# Patient Record
Sex: FEMALE | Race: WHITE | NOT HISPANIC OR LATINO | Employment: OTHER | URBAN - METROPOLITAN AREA
[De-identification: names, ages, dates, MRNs, and addresses within clinical notes are randomized per-mention and may not be internally consistent; named-entity substitution may affect disease eponyms.]

---

## 2018-03-16 NOTE — PATIENT DISCUSSION
1.  Nuclear Sclerotic Cataract OU: Explained how cataracts can effect vision. Recommend clinical observation. The patient was advised to contact us if any change or worsening of vision. 2. 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 Decompensated phoria. Try Fresnel 2 BI OD. If successful then have ground into glasses. 3.  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 Stressed the importance of regular followup and good control of BS BP and Lipids to avoid future complications. Patient was found to have macular edema on examination and testing today. Risks benefits and alternatives of treatment were discussed. 4. Return for an appointment in 2 weeks for office call and Reraction. with Dr. Ashkan Kiran.

## 2018-03-28 NOTE — PATIENT DISCUSSION
1.  Nuclear Sclerotic Cataract OU: Explained how cataracts can effect vision. Recommend clinical observation. The patient was advised to contact us if any change or worsening of vision. 2. Refractive error - Have new glasses with 1.5 BI OU. 3. Diplopia - significant decrease in diplopia frequency with 2 BI Fresnel OD. Increase prism to total of 3 BI. 4. Return for an appointment in 6 months for comprehensive exam. with Dr. Jenn Hooks.

## 2018-03-28 NOTE — PATIENT DISCUSSION
Diplopia - significant decrease in diplopia frequency with 2 BI Fresnel OD. Increase prism to total of 3 BI.

## 2018-09-26 NOTE — PATIENT DISCUSSION
1.  Nuclear Sclerotic Cataract OU: Monitor. 2. Diplopia - progressive decompensation of phoria. Has 3 BI in glasses but getting intermittent diplopia now especially at near. Patient to try 5 minutes of push up exercises in am and pm for 3-4 weeks to see if helps with fusion. If not will need more prism in glasses. 3. Return for an appointment in 3 weeks for office call. with Dr. Nick Salamanca.

## 2018-09-26 NOTE — PATIENT DISCUSSION
1. DM II with moderate Non-proliferative Diabetic Retinopathy OU:  Discussed the pathophysiology of diabetes and its effect on the eye. Stressed the importance of regular followup and good control of BS BP and Lipids to avoid future complications. 2. Nuclear Sclerotic Cataract OU: Monitor. 3. Diplopia - progressive decompensation of phoria. Has 3 BI in glasses but getting intermittent diplopia now especially at near. Patient to try 5 minutes of push up exercises in am and pm for 3-4 weeks to see if helps with fusion. If not will need more prism in glasses. 4. Return for an appointment in 3 weeks for office call/DF with Dr. Milan Miramontes.

## 2018-10-30 NOTE — PATIENT DISCUSSION
Return for an appointment in 8 months for comprehensive exam. and Optos Fundus Photo Macula. with Dr. Oscar Crisostomo.

## 2018-10-30 NOTE — PATIENT DISCUSSION
1.  PVD OU:  Patient was cautioned to call our office immediately if they experience a substantial change in their symptoms such as an increase in floaters persistent flashes loss of visual field (may appear as a shadow or a curtain) or decrease in visual acuity as these may indicate a retinal tear or detachment. If this is a new problem patient will need to return for re-examination  as determined by the [address]. DM II with moderate Non-proliferative Diabetic Retinopathy OU:  Discussed the pathophysiology of diabetes and its effect on the eye. Stressed the importance of regular followup and good control of BS BP and Lipids to avoid future complications. 3. Diplopia - stable intermittent exotropia. Satisfied with present prism. Monitor. 4. Return for an appointment in 8 months for comprehensive exam. and Optos Fundus Photo Macula. with Dr. Ashley Alarcon.

## 2018-10-30 NOTE — PATIENT DISCUSSION
DM II with moderate Non-proliferative Diabetic Retinopathy OU:  Discussed the pathophysiology of diabetes and its effect on the eye. Stressed the importance of regular followup and good control of BS BP and Lipids to avoid future complications.

## 2019-09-16 NOTE — PATIENT DISCUSSION
1.  PVD OU:  monitor2. DM II with moderate Non-proliferative Diabetic Retinopathy OU:  Discussed the pathophysiology of diabetes and its effect on the eye. Stressed the importance of regular followup and good control of BS BP and Lipids to avoid future complications. 3. Diplopia - stable intermittent exotropia. Satisfied with present prism. Monitor. 4. Return for an appointment in 10 months for comprehensive exam. and Optos Fundus Photo Macula. with Dr. Zion Mehta.

## 2020-07-16 NOTE — PATIENT DISCUSSION
1.  Cataract progressing OD>OS - consult FP. 2.  1.  PVD OU:  monitor2. DM II with moderate Non-proliferative Diabetic Retinopathy OU:  Stable monitor. 3. Diplopia - stable intermittent exotropia. Satisfied with present prism. Monitor. 4. Return for an appointment in 10 months for comprehensive exam and Optos Fundus Photo with Dr. Benjamin Jordan.

## 2020-07-16 NOTE — PATIENT DISCUSSION
1.  PVD OU:  monitor2. DM II with moderate Non-proliferative Diabetic Retinopathy OU:  Stable monitor. 3. Diplopia - stable intermittent exotropia. Satisfied with present prism. Monitor. 4. Return for an appointment in 10 months for comprehensive exam and Optos Fundus Photo with Dr. Jennifer Jenkins.

## 2020-09-02 NOTE — PATIENT DISCUSSION
1.  Discussed the risks benefits alternatives and limitations of cataract surgery including infection bleeding loss of vision retinal tears detachment. The patient stated a full understanding and a desire to proceed with the procedure in both eyes. Refractive options were reviewed. Patient has elected to be optimized for distance vision in both eyes. The patient will still need glasses for reading and to possibly fine tune distance vision. Malyugin ring on hold on tamsulosinSchedule KPE/IOL od/os medical clearancePt will still need glasses with prism postop2. PVD OU:  Patient was cautioned to call our office immediately if they experience a substantial change in their symptoms such as an increase in floaters persistent flashes loss of visual field (may appear as a shadow or a curtain) or decrease in visual acuity as these may indicate a retinal tear or detachment. If this is a new problem patient will need to return for re-examination  as determined by the [address]. Diplopia - stable with prism in glasses4. DM I with mild Non-proliferative Diabetic Retinopathy OU:  Discussed the pathophysiology of diabetes and its effect on the eye. Stressed the importance of regular followup and good control of BS BP and Lipids to avoid future complications. 5. Return for an appointment for [address] [place] [address] with Dr. Lorena Martinez.  Spoke with son to review findings and recommendations

## 2020-09-29 NOTE — PATIENT DISCUSSION
Discussed the risks benefits alternatives and limitations of cataract surgery including infection bleeding loss of vision retinal tears detachment. The patient stated a full understanding and a desire to proceed with the procedure in both eyes. Refractive options were reviewed. Patient has elected to be optimized for distance vision in both eyes. The patient will still need glasses for reading and to possibly fine tune distance vision. Standard implant pt has prism in glasses and will still need glasses after surgery for astigmatism and prism

## 2020-10-13 NOTE — PATIENT DISCUSSION
Post-Op Day #1 - Cataract Surgery Right Eye (OD) - doing well. Tears prn. Continue postop drops as directed. Call office with symptoms of pain redness or decreased vision in operative eye. 1 drop tobramycin instilled. Discussed use of Malyugin ring for small pupil

## 2020-10-20 NOTE — PATIENT DISCUSSION
1.  Post-Op Day #1 - Cataract Surgery Left Eye (OS) - doing well. Tears prn. Continue postop drops as directed. Call office with symptoms of pain redness or decreased vision in operative eye.  1 drop of tobramycin instilled2. Post-Op Week #1 - Cataract Surgery Right Eye (OD) - Intraocular lens stable and surgery very well healed. Patient to resume all normal activities. Finish postop drops as directed. Final Refraction given if necessary. Call with any problems. 3.  DM II with moderate Non-proliferative Diabetic Retinopathy OU:  Discussed the pathophysiology of diabetes and its effect on the eye. Stressed the importance of regular followup and good control of BS BP and Lipids to avoid future complications. f/u Dr Browne Deal in a month to reevaluate the retina4. Return for an appointment in 1 month for post op refraction. with Dr. Akilah Rodriguez.

## 2020-10-27 NOTE — PATIENT DISCUSSION
1.  Post-Op Week #1 - Cataract Surgery Left Eye (OS) -  Intraocular lens stable and surgery very well healed. Patient to resume all normal activities. Finish postop drops as directed. Final Refraction given if necessary. Call with any problems. 2. Post-Op Week #2 -  Cataract Surgery Right Eye (OD) - Intraocular lens stable and surgery very well healed. Patient to resume all normal activities. Finish postop drops as directed. Final Refraction given if necessary. 3. Return for an appointment in 6 weeks for post op exam. with Dr. Ashley Alarcon.

## 2020-10-27 NOTE — PATIENT DISCUSSION
Post-Op Cataract Surgery 15-90 days Right Eye (OD):  Doing well with stable vision.   Monitor for changes

## 2020-11-06 NOTE — PATIENT DISCUSSION
1.  Post-Op Cataract Surgery 15-90 days Both Eyes (OU)-  Doing well with stable vision. Monitor for changes2. Return for an appointment in 6 months for comprehensive exam. with Dr. Rakan Fox.

## 2021-08-04 NOTE — PATIENT DISCUSSION
Diplopia and UL ptosis  - highly suspicious for MG after positive ice test.Blood work recommended to r/o out Myasthenia gravis. Return for an appointment in 2 weeks for office call. with Dr. Felipe Bonilla.

## 2021-08-18 NOTE — PATIENT DISCUSSION
Diplopia and UL ptosis  - NEGATIVE MG PANEL BUT  highly suspicious for MG after positive ice test last time. commitant 10 PD RHT and 6XTFLUCTUATING BILATERAL UL PTOSISReturn for an appointment in 2 weeks for office call. with Dr. Melissa Sharpe.

## 2021-09-01 NOTE — PATIENT DISCUSSION
Persistent Diplopia and UL ptosis  - NEGATIVE MG PANEL BUT  highly suspicious for MG after positive ice test last time. 2 weeks of 60 mg of Prednisone - no change so far commitant 10 PD RHT and 10XT - relatively stable compared to 2 weeks agoOne of sons is a local endocrinologist and I spoke owth him 2 weeks ago about steroid Txcheck sugar within 1-2 weeks - discussedReturn for an appointment in 2 weeks for office call with Dr. Shala Duke.

## 2021-09-15 NOTE — PATIENT DISCUSSION
Persistent Diplopia and UL ptosis   - no improvement with steroids x 1 month- NEGATIVE MG PANEL BUT  highly suspicious for MG after positive ice test last time. taper off 60 mg of Prednisone - within a week commitant 10 PD RHT and 10XT - prosm glasses Rx todayReturn for an appointment in 2 months for office call with Dr. Ellin Jeans.

## 2021-11-09 NOTE — PATIENT DISCUSSION
SIGNIFICANTLY IMPROVED  Diplopia and UL ptosis   - INITIALLY  no improvement with steroids x 1 month WHICH HE STOPPED 6 WEEKS AGO. USES PRISMATIC GLASSES- NEGATIVE MG PANEL BUT  highly suspicious for MG after positive ice test last time. SUSPECTED CLINICAL MG SERONEGATIVEReturn for an appointment in 6 months for office call with Dr. Ellin Jeans.

## 2022-02-24 ENCOUNTER — ESTABLISHED PATIENT (OUTPATIENT)
Dept: URBAN - METROPOLITAN AREA CLINIC 25 | Facility: CLINIC | Age: 67
End: 2022-02-24

## 2022-02-24 DIAGNOSIS — H52.13: ICD-10-CM

## 2022-02-24 PROCEDURE — 92499RSE RETINAL SCREENING ELECTIVE

## 2022-02-24 PROCEDURE — 92015 DETERMINE REFRACTIVE STATE: CPT

## 2022-02-24 PROCEDURE — 92014 COMPRE OPH EXAM EST PT 1/>: CPT

## 2022-02-24 PROCEDURE — 92310-3 LEVEL 3 CONTACT LENS MANAGEMENT

## 2022-02-24 ASSESSMENT — VISUAL ACUITY
OU_CC: J3
OD_CC: 20/25
OS_CC: 20/25

## 2022-02-24 ASSESSMENT — TONOMETRY
OD_IOP_MMHG: 20
OS_IOP_MMHG: 21

## 2022-04-25 NOTE — PATIENT DISCUSSION
SIGNIFICANTLY IMPROVED  Diplopia and UL ptosis   - INITIALLY  no improvement with steroids x 1 month WHICH HE STOPPED 6 WEEKS AGO. USES PRISMATIC GLASSES- NEGATIVE MG PANEL BUT  highly suspicious for MG after positive ice test last time. SUSPECTED CLINICAL MG SERONEGATIVEReturn for an appointment in 6 months for office call with Dr. Dre Earl.

## 2022-07-09 ENCOUNTER — TELEPHONE ENCOUNTER (OUTPATIENT)
Dept: URBAN - METROPOLITAN AREA CLINIC 121 | Facility: CLINIC | Age: 67
End: 2022-07-09

## 2022-07-10 ENCOUNTER — TELEPHONE ENCOUNTER (OUTPATIENT)
Dept: URBAN - METROPOLITAN AREA CLINIC 121 | Facility: CLINIC | Age: 67
End: 2022-07-10

## 2022-07-10 RX ORDER — MV,CALCIUM,MIN/IRON/FOLIC ACID 18-0.4-6MG
TABLET ORAL ONCE A DAY
Refills: 0 | Status: ACTIVE | COMMUNITY
Start: 2015-01-28

## 2022-07-10 RX ORDER — ANASTROZOLE 1 MG/1
TABLET ORAL
Refills: 0 | Status: ACTIVE | COMMUNITY
Start: 2014-12-06

## 2022-07-10 RX ORDER — ALENDRONATE SODIUM 70 MG/1
TABLET ORAL
Refills: 0 | Status: ACTIVE | COMMUNITY
Start: 2014-12-06

## 2022-07-10 RX ORDER — IBUPROFEN 200 MG
CAPSULE ORAL
Refills: 0 | Status: ACTIVE | COMMUNITY
Start: 2015-01-28

## 2022-07-10 RX ORDER — OLMESARTAN MEDOXOMIL-HYDROCHLOROTHIAZIDE 25; 40 MG/1; MG/1
TABLET, FILM COATED ORAL
Refills: 0 | Status: ACTIVE | COMMUNITY
Start: 2015-01-24

## 2022-10-31 ENCOUNTER — LAB OUTSIDE AN ENCOUNTER (OUTPATIENT)
Dept: URBAN - METROPOLITAN AREA CLINIC 63 | Facility: CLINIC | Age: 67
End: 2022-10-31

## 2022-10-31 ENCOUNTER — OFFICE VISIT (OUTPATIENT)
Dept: URBAN - METROPOLITAN AREA CLINIC 63 | Facility: CLINIC | Age: 67
End: 2022-10-31
Payer: COMMERCIAL

## 2022-10-31 ENCOUNTER — WEB ENCOUNTER (OUTPATIENT)
Dept: URBAN - METROPOLITAN AREA CLINIC 63 | Facility: CLINIC | Age: 67
End: 2022-10-31

## 2022-10-31 VITALS
BODY MASS INDEX: 28.25 KG/M2 | SYSTOLIC BLOOD PRESSURE: 110 MMHG | HEART RATE: 90 BPM | OXYGEN SATURATION: 98 % | TEMPERATURE: 98 F | WEIGHT: 175.8 LBS | DIASTOLIC BLOOD PRESSURE: 64 MMHG | HEIGHT: 66 IN

## 2022-10-31 DIAGNOSIS — R10.13 EPIGASTRIC PAIN: ICD-10-CM

## 2022-10-31 DIAGNOSIS — R07.89 NON-CARDIAC CHEST PAIN: ICD-10-CM

## 2022-10-31 PROCEDURE — 99203 OFFICE O/P NEW LOW 30 MIN: CPT | Performed by: PHYSICIAN ASSISTANT

## 2022-10-31 RX ORDER — PANTOPRAZOLE SODIUM 40 MG/1
1 TABLET TABLET, DELAYED RELEASE ORAL ONCE A DAY
Status: ACTIVE | COMMUNITY

## 2022-10-31 RX ORDER — PANTOPRAZOLE SODIUM 40 MG/1
1 TABLET TABLET, DELAYED RELEASE ORAL ONCE A DAY
OUTPATIENT

## 2022-10-31 RX ORDER — SERTRALINE HYDROCHLORIDE 100 MG/1
TAKE ONE TABLET BY MOUTH ONE TIME DAILY TABLET, FILM COATED ORAL
Qty: 90 UNSPECIFIED | Refills: 3 | Status: ACTIVE | COMMUNITY

## 2022-10-31 RX ORDER — OMEPRAZOLE 40 MG/1
1 CAPSULE 30 MINUTES BEFORE MORNING MEAL CAPSULE, DELAYED RELEASE ORAL ONCE A DAY
Qty: 30 | Refills: 3 | OUTPATIENT

## 2022-10-31 RX ORDER — CELECOXIB 200 MG/1
1 CAPSULE WITH FOOD CAPSULE ORAL AS NEEDED
Refills: 0 | Status: ACTIVE | COMMUNITY

## 2022-10-31 RX ORDER — ATORVASTATIN CALCIUM 20 MG/1
1 TABLET TABLET, FILM COATED ORAL ONCE A DAY
Status: ACTIVE | COMMUNITY

## 2022-10-31 RX ORDER — OLMESARTAN MEDOXOMIL AND HYDROCHLOROTHIAZIDE 20/12.5 20; 12.5 MG/1; MG/1
1 TABLET TABLET ORAL ONCE A DAY
Refills: 3 | Status: ACTIVE | COMMUNITY

## 2022-10-31 NOTE — HPI-TODAY'S VISIT:
67-year-old female with history of hypertension and left breast cancer presents to the office for follow-up after recent admission to [facility] on October 26, 2022 with complaints of chest pain.  She reported intermittent substernal/epigastric pain radiating across her chest and into her back which had been going on intermittently for about 1 week.  Pain was triggered by meals. Her labs on admission including CBC, CMP, lipase TSH were unremarkable.  Chest x-ray was negative.  ECG and troponins were negative.  Treadmill stress test was done and was negative.  CT heart scan was recommended and ordered. This is scheduled at [facility] on 11/14.   Her symptoms resolved and she was discharged with pantoprazole 40 mg daily and advised to follow-up with her PCP.  She states she was in [place] when symptoms began about one week prior to her hospital admission. She was indulging in more alcohol than usual and was eating a lot of BBQ foods.  Her symptoms did not resolve when she got home. She was driving to work one day and her blood pressure was elevated which alarmed her and she went to the ER.   Her pain is now resolved though she states she has a fullness in the epigastrium. She denies pyrosis, dysphagia, odynophagia. She has no nausea or vomiting. She is eating a more bland diet but states she is not having problems eating. She has had more frequent BMs but stools are formed. She denies diarrhea, constipation, melena, hematochezia.   She has never had an EGD.   Occasionally she will take 1/2 of an Advil PM for sleep. She uses celebrex as needed for OA.  Otherwise, no NSAID use.   Her last colonoscopy was done by Dr. Ambrocio in February 2015 which was normal to the cecum with the exception of sigmoid diverticulosis and internal hemorrhoids.  Repeat colonoscopy was recommended in 10 years.  She has no family history of any GI problems or cancers.

## 2022-11-14 ENCOUNTER — TELEPHONE ENCOUNTER (OUTPATIENT)
Dept: URBAN - METROPOLITAN AREA CLINIC 63 | Facility: CLINIC | Age: 67
End: 2022-11-14

## 2022-11-22 ENCOUNTER — CLAIMS CREATED FROM THE CLAIM WINDOW (OUTPATIENT)
Dept: URBAN - METROPOLITAN AREA CLINIC 61 | Facility: CLINIC | Age: 67
End: 2022-11-22
Payer: COMMERCIAL

## 2022-11-22 ENCOUNTER — CLAIMS CREATED FROM THE CLAIM WINDOW (OUTPATIENT)
Dept: URBAN - METROPOLITAN AREA SURGERY CENTER 4 | Facility: SURGERY CENTER | Age: 67
End: 2022-11-22
Payer: COMMERCIAL

## 2022-11-22 DIAGNOSIS — K31.7 BENIGN GASTRIC POLYP: ICD-10-CM

## 2022-11-22 DIAGNOSIS — K31.89 GASTRIC NODULE: ICD-10-CM

## 2022-11-22 DIAGNOSIS — K20.90 ESOPHAGITIS: ICD-10-CM

## 2022-11-22 DIAGNOSIS — K31.7 GASTRIC POLYP: ICD-10-CM

## 2022-11-22 DIAGNOSIS — R07.89 NON-CARDIAC CHEST PAIN: ICD-10-CM

## 2022-11-22 PROCEDURE — 88312 SPECIAL STAINS GROUP 1: CPT | Performed by: INTERNAL MEDICINE

## 2022-11-22 PROCEDURE — 43239 EGD BIOPSY SINGLE/MULTIPLE: CPT | Performed by: INTERNAL MEDICINE

## 2022-11-22 RX ORDER — OLMESARTAN MEDOXOMIL AND HYDROCHLOROTHIAZIDE 20/12.5 20; 12.5 MG/1; MG/1
1 TABLET TABLET ORAL ONCE A DAY
Refills: 3 | Status: ACTIVE | COMMUNITY

## 2022-11-22 RX ORDER — SERTRALINE HYDROCHLORIDE 100 MG/1
TAKE ONE TABLET BY MOUTH ONE TIME DAILY TABLET, FILM COATED ORAL
Qty: 90 UNSPECIFIED | Refills: 3 | Status: ACTIVE | COMMUNITY

## 2022-11-22 RX ORDER — OMEPRAZOLE 40 MG/1
1 CAPSULE 30 MINUTES BEFORE MORNING MEAL CAPSULE, DELAYED RELEASE ORAL ONCE A DAY
Qty: 30 | Refills: 3 | Status: ACTIVE | COMMUNITY

## 2022-11-22 RX ORDER — ATORVASTATIN CALCIUM 20 MG/1
1 TABLET TABLET, FILM COATED ORAL ONCE A DAY
Status: ACTIVE | COMMUNITY

## 2022-11-22 RX ORDER — CELECOXIB 200 MG/1
1 CAPSULE WITH FOOD CAPSULE ORAL AS NEEDED
Refills: 0 | Status: ACTIVE | COMMUNITY

## 2022-11-30 PROBLEM — 78809005 GASTRIC POLYP: Status: ACTIVE | Noted: 2022-11-30

## 2022-12-13 ENCOUNTER — OFFICE VISIT (OUTPATIENT)
Dept: URBAN - METROPOLITAN AREA CLINIC 63 | Facility: CLINIC | Age: 67
End: 2022-12-13
Payer: COMMERCIAL

## 2022-12-13 ENCOUNTER — LAB OUTSIDE AN ENCOUNTER (OUTPATIENT)
Dept: URBAN - METROPOLITAN AREA CLINIC 63 | Facility: CLINIC | Age: 67
End: 2022-12-13

## 2022-12-13 VITALS
WEIGHT: 176.2 LBS | OXYGEN SATURATION: 97 % | TEMPERATURE: 97.4 F | SYSTOLIC BLOOD PRESSURE: 120 MMHG | HEIGHT: 66 IN | DIASTOLIC BLOOD PRESSURE: 80 MMHG | HEART RATE: 70 BPM | BODY MASS INDEX: 28.32 KG/M2

## 2022-12-13 DIAGNOSIS — R07.89 NON-CARDIAC CHEST PAIN: ICD-10-CM

## 2022-12-13 DIAGNOSIS — Z12.11 SCREENING FOR MALIGNANT NEOPLASM OF COLON: ICD-10-CM

## 2022-12-13 DIAGNOSIS — K21.00 GASTROESOPHAGEAL REFLUX DISEASE WITH ESOPHAGITIS WITHOUT HEMORRHAGE: ICD-10-CM

## 2022-12-13 DIAGNOSIS — K31.89 SUBMUCOSAL LESION OF STOMACH: ICD-10-CM

## 2022-12-13 DIAGNOSIS — K76.0 FATTY LIVER: ICD-10-CM

## 2022-12-13 DIAGNOSIS — K80.20 GALLSTONES: ICD-10-CM

## 2022-12-13 DIAGNOSIS — K31.7 BENIGN GASTRIC POLYP: ICD-10-CM

## 2022-12-13 PROBLEM — 235919008: Status: ACTIVE | Noted: 2022-12-13

## 2022-12-13 PROCEDURE — 99214 OFFICE O/P EST MOD 30 MIN: CPT | Performed by: PHYSICIAN ASSISTANT

## 2022-12-13 RX ORDER — OLMESARTAN MEDOXOMIL AND HYDROCHLOROTHIAZIDE 20/12.5 20; 12.5 MG/1; MG/1
1 TABLET TABLET ORAL ONCE A DAY
Refills: 3 | Status: ACTIVE | COMMUNITY

## 2022-12-13 RX ORDER — CELECOXIB 200 MG/1
1 CAPSULE WITH FOOD CAPSULE ORAL AS NEEDED
Refills: 0 | Status: ACTIVE | COMMUNITY

## 2022-12-13 RX ORDER — SERTRALINE HYDROCHLORIDE 100 MG/1
TAKE ONE TABLET BY MOUTH ONE TIME DAILY TABLET, FILM COATED ORAL
Qty: 90 UNSPECIFIED | Refills: 3 | Status: ACTIVE | COMMUNITY

## 2022-12-13 NOTE — HPI-TODAY'S VISIT:
67-year-old female with history of hypertension and left breast cancer presents for follow-up after EGD which was done to evaluate noncardiac chest pain.  Her EGD was done on November 22, 2022.  Her EGD demonstrated LA grade a esophagitis in the distal esophagus.  Biopsy of the distal esophagus showed reflux-type changes and inflammation. No intestinal metaplasia. Small hiatal hernia was present.  Multiple small polyps were found in the gastric body.  The pathology showed fundic gland polyp.  Mildly erythematous mucosa was found in the gastric antrum.  Gastric antral biopsy was negative for H. pylori.  A single 5 to 7 mm submucosal nodule was found in the gastric antrum.  The pathology showed benign gastric mucosa.  The duodenum appeared normal with unremarkable biopsy.  ****** She follows up in the office today doing well.  She had no problems following her procedure. She previously reported incresed frequency of bowel movements after starting pantoprazole and this was changed to omeprazole 40mg daily. Abdominal sonogram was done on 11/14/22 and showed fatty infiltration of the liver and gallstones without evidence of cholelithiasis. She states she has been asymptomatic following her EGD and decided not to start using omeprazole. She has had occasional mild GERD symptoms since her last visit, especially after drinking wine. She has not had any further chest or back pain. She has no other GI complaints.   ****** She was initially seen in the office on October 31 after a recent hospital admission with complaints of chest pain.  She reported intermittent substernal/epigastric pain radiating across her chest and into her back which had been going on intermittently for about 1 week.  Her pain was triggered by meals.  Her labs on admission were unremarkable including CBC, CMP, lipase, and TSH.  Chest x-ray was negative.  ECG and troponins were negative.  Treadmill stress test was done and was negative.  Her symptoms ultimately resolved and she was discharged with pantoprazole 40 mg daily.  ****** At the time of her initial office visit she had not had any recurrent pain but reported feeling a fullness in the epigastrium.  ******  Her last colonoscopy was done in February 2015 which was normal to the cecum with the exception of sigmoid diverticulosis and internal hemorrhoids.  Repeat colonoscopy was recommended in 10 years. She has no family history of any GI problems or cancers.

## 2023-01-16 ENCOUNTER — WEB ENCOUNTER (OUTPATIENT)
Dept: URBAN - METROPOLITAN AREA CLINIC 63 | Facility: CLINIC | Age: 68
End: 2023-01-16

## 2023-01-17 ENCOUNTER — OFFICE VISIT (OUTPATIENT)
Dept: URBAN - METROPOLITAN AREA CLINIC 63 | Facility: CLINIC | Age: 68
End: 2023-01-17

## 2023-01-17 RX ORDER — CELECOXIB 200 MG/1
1 CAPSULE WITH FOOD CAPSULE ORAL AS NEEDED
Refills: 0 | Status: ACTIVE | COMMUNITY

## 2023-01-17 RX ORDER — SERTRALINE HYDROCHLORIDE 100 MG/1
TAKE ONE TABLET BY MOUTH ONE TIME DAILY TABLET, FILM COATED ORAL
Qty: 90 UNSPECIFIED | Refills: 3 | Status: ACTIVE | COMMUNITY

## 2023-01-17 RX ORDER — OLMESARTAN MEDOXOMIL AND HYDROCHLOROTHIAZIDE 20/12.5 20; 12.5 MG/1; MG/1
1 TABLET TABLET ORAL ONCE A DAY
Refills: 3 | Status: ACTIVE | COMMUNITY

## 2023-01-17 NOTE — HPI-TODAY'S VISIT:
67-year-old female with history of hypertension and breast cancer presents for follow-up.  She underwent EGD in November 2022 to evaluate noncardiac chest pain.  Her EGD demonstrated LA grade a esophagitis in the distal esophagus.  Biopsy of the distal esophagus showed reflux type changes.  No intestinal metaplasia.  Small hiatal hernia was present.  Multiple small polyps were found in the gastric body.  The pathology showed fundic gland polyps.  Mildly erythematous mucosa was found in the gastric antrum.  Gastric biopsy was negative for H. pylori.  A single 5 to 7 mm submucosal nodule was found in the gastric antrum.  The pathology of the overlying mucosa showed benign gastric mucosa.  The duodenum appeared normal with unremarkable biopsy ***** She followed up in the office in December doing well.  She had previously reported increased frequency of bowel movements after she started taking pantoprazole and this was changed to omeprazole 40 mg daily.  She decided not to use omeprazole and reporting having no further chest pain or back pain.  She had occasional mild GERD symptoms, mostly after drinking wine.  She had no other GI complaints.  Abdominal sonogram was done in November 2022 which showed fatty infiltration of the liver and gallstones without evidence of cholecystitis. ***** She was advised to abstain from alcohol.  Weight loss was recommended.  A FibroScan was ordered which was not yet Completed by the patient. ***** Last colonoscopy was done in February 2015 which was normal to the cecum with the exception of sigmoid diverticulosis and internal hemorrhoids.  Repeat colonoscopy was recommended in 10 years.  She has no family history of any GI problems or cancers.

## 2023-02-23 ENCOUNTER — OFFICE VISIT (OUTPATIENT)
Dept: URBAN - METROPOLITAN AREA CLINIC 63 | Facility: CLINIC | Age: 68
End: 2023-02-23
Payer: COMMERCIAL

## 2023-02-23 ENCOUNTER — DASHBOARD ENCOUNTERS (OUTPATIENT)
Age: 68
End: 2023-02-23

## 2023-02-23 ENCOUNTER — WEB ENCOUNTER (OUTPATIENT)
Dept: URBAN - METROPOLITAN AREA CLINIC 63 | Facility: CLINIC | Age: 68
End: 2023-02-23

## 2023-02-23 VITALS
OXYGEN SATURATION: 96 % | TEMPERATURE: 96.8 F | HEART RATE: 67 BPM | DIASTOLIC BLOOD PRESSURE: 70 MMHG | BODY MASS INDEX: 28.28 KG/M2 | SYSTOLIC BLOOD PRESSURE: 120 MMHG | WEIGHT: 176 LBS | HEIGHT: 66 IN

## 2023-02-23 DIAGNOSIS — K76.0 FATTY LIVER: ICD-10-CM

## 2023-02-23 DIAGNOSIS — Z12.11 SCREENING FOR MALIGNANT NEOPLASM OF COLON: ICD-10-CM

## 2023-02-23 DIAGNOSIS — K31.89 SUBMUCOSAL LESION OF STOMACH: ICD-10-CM

## 2023-02-23 DIAGNOSIS — K21.00 GASTROESOPHAGEAL REFLUX DISEASE WITH ESOPHAGITIS WITHOUT HEMORRHAGE: ICD-10-CM

## 2023-02-23 PROBLEM — 197321007: Status: ACTIVE | Noted: 2022-12-13

## 2023-02-23 PROBLEM — 266433003: Status: ACTIVE | Noted: 2022-12-13

## 2023-02-23 PROCEDURE — 99214 OFFICE O/P EST MOD 30 MIN: CPT | Performed by: PHYSICIAN ASSISTANT

## 2023-02-23 RX ORDER — CELECOXIB 200 MG/1
1 CAPSULE WITH FOOD CAPSULE ORAL AS NEEDED
Refills: 0 | Status: ACTIVE | COMMUNITY

## 2023-02-23 RX ORDER — ATORVASTATIN CALCIUM 20 MG/1
1 TABLET TABLET, FILM COATED ORAL ONCE A DAY
Status: ACTIVE | COMMUNITY

## 2023-02-23 RX ORDER — OLMESARTAN MEDOXOMIL AND HYDROCHLOROTHIAZIDE 20/12.5 20; 12.5 MG/1; MG/1
1 TABLET TABLET ORAL ONCE A DAY
Refills: 3 | Status: ACTIVE | COMMUNITY

## 2023-02-23 NOTE — HPI-TODAY'S VISIT:
67-year-old female with hypertension and history of left breast cancer was last seen in the office in December 2022 for follow-up of EGD which was done on November 22, 2022 to evaluate noncardiac chest pain.  Her EGD demonstrated LA grade a esophagitis in the distal esophagus.  Biopsy of the distal esophagus showed reflux type changes and inflammation.  No intestinal metaplasia.  Small hiatal hernia was present.  Multiple small polyps were found in the gastric body.  The pathology showed fundic gland polyp.  Mildly erythematous mucosa was found in the gastric antrum.  Gastric antral biopsy was negative for H. pylori.  A single 5-7 mm submucosal nodule was found in the gastric antrum.  Biopsy of the overlying mucosa showed benign gastric mucosa.  The duodenum appeared normal with unremarkable biopsy.  Repeat EGD/EUS was recommended in 1 year. She was doing well at her last office visit though she reported increased frequency of regular bowel movements after starting pantoprazole and this was changed to omeprazole 40 mg daily.  Abdominal sonogram was done in November 2022 which showed fatty infiltration of the liver and gallstones with no evidence of cholecystitis.  She decided not to start omeprazole but she was not having any severe GERD symptoms.  She reported having only occasional mild GERD symptoms, especially after drinking wine.  She had not had any further chest pain and had no other GI complaints. She follows up today after having been sent for a FibroScan to assess fatty liver.  Her FibroScan was done on February 16, 2023.  This demonstrated severe steatosis (S3) with no fibrosis (F0).  Her liver enzymes were normal in October 2022. She feels well. Not having any GERD symptoms. She has no GI complaints.  Last colonoscopy was done in February 2015 which was normal to the cecum with the exception of sigmoid diverticulosis and internal hemorrhoids. She has no family history of any GI problems or cancers.

## 2024-03-07 ENCOUNTER — ESTABLISHED PATIENT (OUTPATIENT)
Dept: URBAN - METROPOLITAN AREA CLINIC 25 | Facility: CLINIC | Age: 69
End: 2024-03-07

## 2024-03-07 DIAGNOSIS — H52.13: ICD-10-CM

## 2024-03-07 DIAGNOSIS — D31.31: ICD-10-CM

## 2024-03-07 PROCEDURE — 92310-3 LEVEL 3 CONTACT LENS MANAGEMENT

## 2024-03-07 PROCEDURE — 92015 DETERMINE REFRACTIVE STATE: CPT

## 2024-03-07 PROCEDURE — 92499RS OCT RETINAL SCREENING, ELECTIVE

## 2024-03-07 PROCEDURE — 92014 COMPRE OPH EXAM EST PT 1/>: CPT

## 2024-03-07 ASSESSMENT — KERATOMETRY
OS_AXISANGLE_DEGREES: 52
OS_K1POWER_DIOPTERS: 43.50
OS_K2POWER_DIOPTERS: 43.75
OD_K2POWER_DIOPTERS: 44.50
OD_AXISANGLE_DEGREES: 25
OD_K1POWER_DIOPTERS: 42.25
OD_K1POWER_DIOPTERS: 42.50
OS_AXISANGLE_DEGREES: 177
OD_AXISANGLE2_DEGREES: 115
OS_AXISANGLE2_DEGREES: 142
OD_AXISANGLE_DEGREES: 18
OD_K2POWER_DIOPTERS: 44.00
OS_AXISANGLE2_DEGREES: 87
OD_AXISANGLE2_DEGREES: 108
OS_K2POWER_DIOPTERS: 44.00

## 2024-03-07 ASSESSMENT — TONOMETRY
OS_IOP_MMHG: 18
OD_IOP_MMHG: 17

## 2024-03-07 ASSESSMENT — VISUAL ACUITY
OS_CC: 20/25+2
OD_CC: 20/25-2

## 2024-10-07 ENCOUNTER — TELEPHONE ENCOUNTER (OUTPATIENT)
Dept: URBAN - METROPOLITAN AREA CLINIC 64 | Facility: CLINIC | Age: 69
End: 2024-10-07

## 2025-01-08 ENCOUNTER — LAB OUTSIDE AN ENCOUNTER (OUTPATIENT)
Dept: URBAN - METROPOLITAN AREA CLINIC 63 | Facility: CLINIC | Age: 70
End: 2025-01-08

## 2025-01-08 ENCOUNTER — OFFICE VISIT (OUTPATIENT)
Dept: URBAN - METROPOLITAN AREA CLINIC 63 | Facility: CLINIC | Age: 70
End: 2025-01-08
Payer: MEDICARE

## 2025-01-08 VITALS
BODY MASS INDEX: 27.38 KG/M2 | SYSTOLIC BLOOD PRESSURE: 118 MMHG | DIASTOLIC BLOOD PRESSURE: 81 MMHG | OXYGEN SATURATION: 97 % | WEIGHT: 170.4 LBS | TEMPERATURE: 98.2 F | HEIGHT: 66 IN | HEART RATE: 88 BPM

## 2025-01-08 DIAGNOSIS — Z12.11 COLON CANCER SCREENING: ICD-10-CM

## 2025-01-08 DIAGNOSIS — K76.0 FATTY LIVER: ICD-10-CM

## 2025-01-08 PROBLEM — 305058001: Status: ACTIVE | Noted: 2025-01-08

## 2025-01-08 PROCEDURE — 99214 OFFICE O/P EST MOD 30 MIN: CPT | Performed by: INTERNAL MEDICINE

## 2025-01-08 RX ORDER — OLMESARTAN MEDOXOMIL AND HYDROCHLOROTHIAZIDE 20; 12.5 MG/1; MG/1
1 TABLET TABLET ORAL ONCE A DAY
Refills: 3 | Status: ACTIVE | COMMUNITY

## 2025-01-08 RX ORDER — CELECOXIB 200 MG/1
1 CAPSULE WITH FOOD CAPSULE ORAL AS NEEDED
Refills: 0 | Status: ACTIVE | COMMUNITY

## 2025-01-08 RX ORDER — ATORVASTATIN CALCIUM 20 MG/1
1 TABLET TABLET, FILM COATED ORAL ONCE A DAY
Status: ACTIVE | COMMUNITY

## 2025-01-08 NOTE — HPI-TODAY'S VISIT:
69-year-old female presents today for EGD/colon consult.  She states that she is doing well from an upper and lower GI perspective.  She states that she is trying to avoid drinking as much socially due to her fatty liver and occasional reflux. She denies dysphagia, dyspepsia, pyrosis, abdominal pain, change in bowel habits, unintentional weight loss, melena, or hematochezia. Patient denies issues with anesthesia, history of stroke, heart attack, pacemaker, defibrillator, stents, blood thinners, COPD, asthma, JAIRO, chronic kidney disease and seizures.  Last colonoscopy 2/9/2015 - Diverticulosis in the sigmoid colon - Internal hemorrhoids - Repeat colonoscopy in 10 years  Last endoscopy 11/22/2022 - LA grade a esophagitis - Small hiatal hernia - Multiple gastric polyps - Erythematous mucosa in the antrum - Single submucosal papule (nodule) found in the stomach  Last FibroScan 2/16/2023 - No evidence of hepatic fibrosis with severe hepatic steatosis - S3 F0 Her last EGD showed a small submucous nodule in the antrum, biopsies were negative. Repeat endoscopy was recommended.

## 2025-01-14 ENCOUNTER — CLAIMS CREATED FROM THE CLAIM WINDOW (OUTPATIENT)
Dept: URBAN - METROPOLITAN AREA CLINIC 63 | Facility: CLINIC | Age: 70
End: 2025-01-14
Payer: MEDICARE

## 2025-01-14 ENCOUNTER — OFFICE VISIT (OUTPATIENT)
Dept: URBAN - METROPOLITAN AREA CLINIC 61 | Facility: CLINIC | Age: 70
End: 2025-01-14
Payer: MEDICARE

## 2025-01-14 DIAGNOSIS — K76.0 FATTY LIVER: ICD-10-CM

## 2025-01-14 PROCEDURE — 76981 USE PARENCHYMA: CPT | Performed by: INTERNAL MEDICINE

## 2025-01-14 RX ORDER — ATORVASTATIN CALCIUM 20 MG/1
1 TABLET TABLET, FILM COATED ORAL ONCE A DAY
Status: ACTIVE | COMMUNITY

## 2025-01-14 RX ORDER — CELECOXIB 200 MG/1
1 CAPSULE WITH FOOD CAPSULE ORAL AS NEEDED
Refills: 0 | Status: ACTIVE | COMMUNITY

## 2025-01-14 RX ORDER — OLMESARTAN MEDOXOMIL AND HYDROCHLOROTHIAZIDE 20; 12.5 MG/1; MG/1
1 TABLET TABLET ORAL ONCE A DAY
Refills: 3 | Status: ACTIVE | COMMUNITY

## 2025-03-10 ENCOUNTER — COMPREHENSIVE EXAM (OUTPATIENT)
Age: 70
End: 2025-03-10

## 2025-03-10 DIAGNOSIS — H52.13: ICD-10-CM

## 2025-03-10 PROCEDURE — 92015 DETERMINE REFRACTIVE STATE: CPT

## 2025-03-10 PROCEDURE — 92499RS OCT RETINAL SCREENING, ELECTIVE

## 2025-03-10 PROCEDURE — 92310-3 LEVEL 3 SOFT LENS UPDATE

## 2025-03-10 PROCEDURE — 92014 COMPRE OPH EXAM EST PT 1/>: CPT

## 2025-04-21 ENCOUNTER — CLAIMS CREATED FROM THE CLAIM WINDOW (OUTPATIENT)
Dept: URBAN - METROPOLITAN AREA SURGERY CENTER 4 | Facility: SURGERY CENTER | Age: 70
End: 2025-04-21
Payer: MEDICARE

## 2025-04-21 ENCOUNTER — CLAIMS CREATED FROM THE CLAIM WINDOW (OUTPATIENT)
Dept: URBAN - METROPOLITAN AREA CLINIC 4 | Facility: CLINIC | Age: 70
End: 2025-04-21
Payer: MEDICARE

## 2025-04-21 DIAGNOSIS — K31.89 OTHER DISEASES OF STOMACH AND DUODENUM: ICD-10-CM

## 2025-04-21 DIAGNOSIS — K29.70 ERYTHEMATOUS GASTROPATHY: ICD-10-CM

## 2025-04-21 DIAGNOSIS — K31.7 POLYP OF STOMACH AND DUODENUM: ICD-10-CM

## 2025-04-21 DIAGNOSIS — K29.70 GASTRITIS, UNSPECIFIED, WITHOUT BLEEDING: ICD-10-CM

## 2025-04-21 DIAGNOSIS — K64.0 FIRST DEGREE HEMORRHOIDS: ICD-10-CM

## 2025-04-21 DIAGNOSIS — Z12.11 SCREENING FOR MALIGNANT NEOPLASM OF COLON: ICD-10-CM

## 2025-04-21 DIAGNOSIS — Z12.11 ENCOUNTER FOR SCREENING FOR MALIGNANT NEOPLASM OF COLON: ICD-10-CM

## 2025-04-21 DIAGNOSIS — K57.30 DIVERTICULOSIS OF LARGE INTESTINE WITHOUT PERFORATION OR ABSCESS WITHOUT BLEEDING: ICD-10-CM

## 2025-04-21 DIAGNOSIS — K22.2 SCHATZKI'S RING: ICD-10-CM

## 2025-04-21 DIAGNOSIS — K21.9 GASTRO-ESOPHAGEAL REFLUX DISEASE WITHOUT ESOPHAGITIS: ICD-10-CM

## 2025-04-21 DIAGNOSIS — K44.9 DIAPHRAGMATIC HERNIA WITHOUT OBSTRUCTION OR GANGRENE: ICD-10-CM

## 2025-04-21 DIAGNOSIS — K20.80 OTHER ESOPHAGITIS WITHOUT BLEEDING: ICD-10-CM

## 2025-04-21 PROCEDURE — 88312 SPECIAL STAINS GROUP 1: CPT | Performed by: PATHOLOGY

## 2025-04-21 PROCEDURE — 0528F RCMND FLW-UP 10 YRS DOCD: CPT | Performed by: INTERNAL MEDICINE

## 2025-04-21 PROCEDURE — 88305 TISSUE EXAM BY PATHOLOGIST: CPT | Performed by: PATHOLOGY

## 2025-04-21 PROCEDURE — G0121 COLON CA SCRN NOT HI RSK IND: HCPCS | Performed by: INTERNAL MEDICINE

## 2025-04-21 PROCEDURE — 43239 EGD BIOPSY SINGLE/MULTIPLE: CPT | Performed by: INTERNAL MEDICINE

## 2025-04-21 PROCEDURE — 00813 ANES UPR LWR GI NDSC PX: CPT | Performed by: NURSE ANESTHETIST, CERTIFIED REGISTERED

## 2025-04-21 RX ORDER — CELECOXIB 200 MG/1
1 CAPSULE WITH FOOD CAPSULE ORAL AS NEEDED
Refills: 0 | Status: ACTIVE | COMMUNITY

## 2025-04-21 RX ORDER — ATORVASTATIN CALCIUM 20 MG/1
1 TABLET TABLET, FILM COATED ORAL ONCE A DAY
Status: ACTIVE | COMMUNITY

## 2025-04-21 RX ORDER — OLMESARTAN MEDOXOMIL AND HYDROCHLOROTHIAZIDE 20; 12.5 MG/1; MG/1
1 TABLET TABLET ORAL ONCE A DAY
Refills: 3 | Status: ACTIVE | COMMUNITY

## 2025-05-14 ENCOUNTER — OFFICE VISIT (OUTPATIENT)
Dept: URBAN - METROPOLITAN AREA CLINIC 63 | Facility: CLINIC | Age: 70
End: 2025-05-14
Payer: MEDICARE

## 2025-05-14 DIAGNOSIS — K21.00 GASTROESOPHAGEAL REFLUX DISEASE WITH ESOPHAGITIS WITHOUT HEMORRHAGE: ICD-10-CM

## 2025-05-14 DIAGNOSIS — K76.0 FATTY LIVER: ICD-10-CM

## 2025-05-14 DIAGNOSIS — K64.8 INTERNAL HEMORRHOIDS: ICD-10-CM

## 2025-05-14 DIAGNOSIS — K74.01 EARLY HEPATIC FIBROSIS: ICD-10-CM

## 2025-05-14 DIAGNOSIS — K44.9 HIATAL HERNIA: ICD-10-CM

## 2025-05-14 PROCEDURE — 99214 OFFICE O/P EST MOD 30 MIN: CPT

## 2025-05-14 RX ORDER — ATORVASTATIN CALCIUM 20 MG/1
1 TABLET TABLET, FILM COATED ORAL ONCE A DAY
Status: ACTIVE | COMMUNITY

## 2025-05-14 RX ORDER — OLMESARTAN MEDOXOMIL AND HYDROCHLOROTHIAZIDE 20; 12.5 MG/1; MG/1
1 TABLET TABLET ORAL ONCE A DAY
Refills: 3 | Status: ACTIVE | COMMUNITY

## 2025-05-14 NOTE — HPI-TODAY'S VISIT:
This is a pleasant 69-year-old female who presents the office today for follow-up of EGD/colonoscopy/Fibroscan.  She reports that she tolerated procedures well.  She continues to report that she is doing well from an upper and lower GI perspective.LA grade A reflux esophagitis was found on endoscopy.  Patient reports that she was taking an Advil PM nightly prior to her endoscopy.  She feels this is likely why she had the esophagitis present.  She has since stopped any use of NSAIDs.  We extensively reviewed FibroScan in office today and compared to prior results from Fibroscan in 2023.  She reports she has cut down on drinking socially significantly.  She reports she drinks about 1 or 2 alcoholic beverages once a week on Friday. She denies dysphagia, dyspepsia, pyrosis, abdominal pain, change in bowel habits, unintentional weight loss, melena, or hematochezia.   FibroScan 9/14/2025 - S0 steatosis, F0-1 fibrosis  February 16, 2023. This demonstrated severe steatosis (S3) with no fibrosis (F0)  Last colonoscopy 4/21/2025 - Internal hemorrhoids - Diverticulosis in the sigmoid colon - No specimens collected - Repeat colonoscopy is not recommended due to advanced age  Last endoscopy 4/21/2025 - LA grade a reflux esophagitis with no bleeding - Nonobstructing Schatzki's ring - 2 cm hiatal hernia - Multiple gastric polyps - Normal stomach - Normal examined duodenum - Pathology: Stomach biopsy chemical/reactive gastropathy.  No evidence of H. pylori or intestinal metaplasia.  Esophageal biopsy squamous mucosa with reflux-type changes no columnar mucosa identified.  No evidence of Rao's esophagus or eosinophilic esophagitis

## 2025-05-15 PROBLEM — 62484002: Status: ACTIVE | Noted: 2025-05-15

## 2025-05-15 PROBLEM — 84089009: Status: ACTIVE | Noted: 2025-05-15
